# Patient Record
Sex: FEMALE | NOT HISPANIC OR LATINO | Employment: UNEMPLOYED | URBAN - METROPOLITAN AREA
[De-identification: names, ages, dates, MRNs, and addresses within clinical notes are randomized per-mention and may not be internally consistent; named-entity substitution may affect disease eponyms.]

---

## 2017-10-26 ENCOUNTER — OFFICE VISIT (OUTPATIENT)
Dept: MEDICAL GROUP | Facility: MEDICAL CENTER | Age: 27
End: 2017-10-26
Attending: FAMILY MEDICINE
Payer: MEDICAID

## 2017-10-26 VITALS
SYSTOLIC BLOOD PRESSURE: 140 MMHG | RESPIRATION RATE: 18 BRPM | TEMPERATURE: 97.4 F | DIASTOLIC BLOOD PRESSURE: 80 MMHG | OXYGEN SATURATION: 98 % | WEIGHT: 113 LBS | HEIGHT: 64 IN | HEART RATE: 112 BPM | BODY MASS INDEX: 19.29 KG/M2

## 2017-10-26 DIAGNOSIS — R00.0 TACHYCARDIA: ICD-10-CM

## 2017-10-26 DIAGNOSIS — Z30.011 ENCOUNTER FOR INITIAL PRESCRIPTION OF CONTRACEPTIVE PILLS: ICD-10-CM

## 2017-10-26 LAB
INT CON NEG: NORMAL
INT CON POS: NORMAL
POC URINE PREGNANCY TEST: NORMAL

## 2017-10-26 PROCEDURE — 99202 OFFICE O/P NEW SF 15 MIN: CPT | Performed by: FAMILY MEDICINE

## 2017-10-26 PROCEDURE — 99203 OFFICE O/P NEW LOW 30 MIN: CPT | Performed by: FAMILY MEDICINE

## 2017-10-26 PROCEDURE — 81025 URINE PREGNANCY TEST: CPT | Performed by: FAMILY MEDICINE

## 2017-10-26 RX ORDER — NORGESTIMATE AND ETHINYL ESTRADIOL 7DAYSX3 28
1 KIT ORAL DAILY
Qty: 28 TAB | Refills: 11 | Status: SHIPPED | OUTPATIENT
Start: 2017-10-26

## 2017-10-26 ASSESSMENT — PATIENT HEALTH QUESTIONNAIRE - PHQ9: CLINICAL INTERPRETATION OF PHQ2 SCORE: 0

## 2017-10-27 NOTE — PROGRESS NOTES
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 Her last menstrual period was last week, pregnancy test at the office today is negative, patient is sexually active with one male partner at this time     Tachycardia   Noticed that the patient pulse rate is on the high side around 112 today, she says she is just anxious because she is at the doctor's office denies chest pain or shortness of breath or chest discomfort, no history of heart disease or thyroid disease. She says sometimes on exertion or workout she feels that she is short of breath and her heart is racing fast   Denies chest pain today or palpitations    There are no active problems to display for this patient.      Current Outpatient Prescriptions   Medication Sig Dispense Refill   • Norgestim-Eth Estrad Triphasic 0.18/0.215/0.25 MG-35 MCG Tab Take 1 Tab by mouth every day. 28 Tab 11     No current facility-administered medications for this visit.          Allergies as of 10/26/2017   • (No Known Allergies)        ROS: Denies any chest pain, Shortness of breath, Changes bowel or bladder, Lower extremity edema.    Physical Exam:  Gen.: Well-developed, well-nourished, no apparent distress,pleasant and cooperative with the examination  Skin:  Warm and dry with good  turgor. No rashes or suspicious lesions in visible areas  Eye: PERRLA, conjunctiva and sclera clear, lids normal  HEENT: Normocephalic/atraumatic, sinuses nontender with palpation, TMs clear, nares patent with pink mucosa and clear rhinorrhea, lips without lesions, oropharynx clear.  Neck: Trachea midline,no masses or adenopathy  Thyroid: normal consistency and size. No masses or nodules. Not tender with palpation.  Cor:Tachycardic with normal rhythm and rhythm without murmur, gallop or rub.  Lungs: Respirations unlabored.Clear to auscultation with equal breath sounds bilaterally. No wheezes, rhonchi.  Abdomen: Soft nontender without hepatosplenomegaly or masses appreciated, normoactive bowel sounds. No hernias.  Extremities: No cyanosis, clubbing or edema, Symmetrical without deformities or malformations. Pulses 2+ and symmetrical both upper and lower extremities  Lymphatic: No abnormal adenopathy of the neck groin or axillae.  Psych: Alert and oriented x 3.Normal affect, judgement,insight and memory.        Assessment and Plan.   27 y.o. female establish care    1. Encounter for initial prescription of contraceptive pills  Advised to start using oral contraceptive pills with the next. On the third day, to use condoms until the next period, previous test in the office today is negative  - Norgestim-Eth Estrad Triphasic 0.18/0.215/0.25 MG-35 MCG Tab; Take 1 Tab by mouth every day.  Dispense: 28 Tab; Refill: 11    2. Tachycardia  Rule out thyroid disease, rule out anemia. Questionably related to anxiety  - TSH; Future  - FREE THYROXINE; Future  - CBC WITH DIFFERENTIAL; Future      Please note that this dictation was created using voice recognition software. I have made every reasonable attempt to correct obvious errors but there may be errors of grammar and content that I may have overlooked prior to finalization of this note.

## 2018-02-16 ENCOUNTER — HOSPITAL ENCOUNTER (EMERGENCY)
Facility: MEDICAL CENTER | Age: 28
End: 2018-02-16
Attending: EMERGENCY MEDICINE
Payer: MEDICAID

## 2018-02-16 VITALS
RESPIRATION RATE: 18 BRPM | TEMPERATURE: 100 F | HEART RATE: 125 BPM | DIASTOLIC BLOOD PRESSURE: 79 MMHG | BODY MASS INDEX: 19.63 KG/M2 | WEIGHT: 115 LBS | HEIGHT: 64 IN | SYSTOLIC BLOOD PRESSURE: 130 MMHG

## 2018-02-16 DIAGNOSIS — F10.920 ACUTE ALCOHOLIC INTOXICATION WITHOUT COMPLICATION (HCC): ICD-10-CM

## 2018-02-16 DIAGNOSIS — R00.0 TACHYCARDIA: ICD-10-CM

## 2018-02-16 PROCEDURE — 93005 ELECTROCARDIOGRAM TRACING: CPT | Performed by: EMERGENCY MEDICINE

## 2018-02-16 PROCEDURE — 99284 EMERGENCY DEPT VISIT MOD MDM: CPT

## 2018-02-16 PROCEDURE — 36415 COLL VENOUS BLD VENIPUNCTURE: CPT

## 2018-02-16 ASSESSMENT — PAIN SCALES - GENERAL
PAINLEVEL_OUTOF10: 0
PAINLEVEL_OUTOF10: 0

## 2018-02-17 LAB — EKG IMPRESSION: NORMAL

## 2018-02-17 NOTE — ED PROVIDER NOTES
"ED Provider Note    CHIEF COMPLAINT  Chief Complaint   Patient presents with   • Medical Clearance     pt BIB police for med clearance, pt denies any medical problems.  Pt has hx of rapid heart rate, denies any chest pain. Denies any illegal drug use.       HPI  Olive Aburto is a 27 y.o. female who presents for medical clearance, brought in by police, apparently she is intoxicated and found to be very tachycardic. The patient reports that she is always tachycardic, usually between 140 and 160, she tells me that she is \"shocked\" that her heart rate (at 130) is \"so low.\" No chest pain, no shortness of breath, no abdominal pain, no other complaints    REVIEW OF SYSTEMS  Negative for fever, rash, chest pain, dyspnea, abdominal pain, nausea, vomiting, diarrhea, headache, focal weakness, focal numbness, focal tingling, back pain. All other systems are negative.     PAST MEDICAL HISTORY  Past Medical History:   Diagnosis Date   • Heart murmur        FAMILY HISTORY  Family History   Problem Relation Age of Onset   • Cancer Maternal Uncle      leukemia        SOCIAL HISTORY  Social History   Substance Use Topics   • Smoking status: Former Smoker   • Smokeless tobacco: Never Used   • Alcohol use No      Comment: rare       SURGICAL HISTORY  Past Surgical History:   Procedure Laterality Date   • OPEN REDUCTION         CURRENT MEDICATIONS  I personally reviewed the medication list in the charting documentation.     ALLERGIES  No Known Allergies    MEDICAL RECORD  I have reviewed patient's medical record and pertinent results are listed above.      PHYSICAL EXAM  VITAL SIGNS: /78   Pulse (!) 147   Temp 37.8 °C (100 °F)   Resp (!) 22   Ht 1.626 m (5' 4\")   Wt 52.2 kg (115 lb)   BMI 19.74 kg/m²    Constitutional: Tearful otherwise well appearing patient in no acute distress.  Not toxic, nor ill in appearance.  HENT: Mucus membranes moist.    Eyes: No scleral icterus. Normal conjunctiva   Neck: Supple, comfortable, " nonpainful range of motion.   Cardiovascular: Regular and tachycardic.   Thorax & Lungs: Chest is nontender.  Lungs are clear to auscultation with good air movement bilaterally.  No wheeze, rhonchi, nor rales.   Abdomen: Soft, with no tenderness, rebound nor guarding.  No mass or pulsatile mass appreciated.  Skin: Warm, dry. No rash appreciated  Extremities/Musculoskeletal: No sign of trauma. No asymmetric calf tenderness, erythema or edema. Normal range of motion   Neurologic: Slurred speech otherwise alert, oriented and without focal deficits    DIAGNOSTIC STUDIES / PROCEDURES    EKG  12 Lead EKG interpreted by me to show:    Rate 1:30  Rhythm: Sinus tach cardia  Axis: Normal  KS and QRS Intervals: Normal  T waves: No acute changes  ST segments: No acute changes  Ectopy: None.    My impression of this EKG: Does not indicate ischemia or arrythmia at this time.      COURSE & MEDICAL DECISION MAKING  I have reviewed any medical record information, laboratory studies and radiographic results as noted above.    Encounter Summary: This is a 27 y.o. female with alcohol intoxication and tachycardia, brought in by police for medical clearance. She states she is chronically tachycardic between 140 and 160 and is surprised that her heart rate is as low as it is at 130. She has no other complaints or findings on exam, medically cleared, will be discharged to police custody      DISPOSITION: Discharge in the police custody      FINAL IMPRESSION  1. Acute alcoholic intoxication without complication (CMS-HCC)    2. Tachycardia           This dictation was created using voice recognition software. The accuracy of the dictation is limited to the abilities of the software. I expect there may be some errors of grammar and possibly content. The nursing notes were reviewed and certain aspects of this information were incorporated into this note.    Electronically signed by: Emil Martines, 2/16/2018 5:53 PM

## 2018-02-17 NOTE — DISCHARGE INSTRUCTIONS
"Ms. Aburto is medically cleared for incarceration      Alcohol Intoxication  Alcohol intoxication occurs when the amount of alcohol that a person has consumed impairs his or her ability to mentally and physically function. Alcohol directly impairs the normal chemical activity of the brain. Drinking large amounts of alcohol can lead to changes in mental function and behavior, and it can cause many physical effects that can be harmful.   Alcohol intoxication can range in severity from mild to very severe. Various factors can affect the level of intoxication that occurs, such as the person's age, gender, weight, frequency of alcohol consumption, and the presence of other medical conditions (such as diabetes, seizures, or heart conditions). Dangerous levels of alcohol intoxication may occur when people drink large amounts of alcohol in a short period (binge drinking). Alcohol can also be especially dangerous when combined with certain prescription medicines or \"recreational\" drugs.  SIGNS AND SYMPTOMS  Some common signs and symptoms of mild alcohol intoxication include:  · Loss of coordination.  · Changes in mood and behavior.  · Impaired judgment.  · Slurred speech.  As alcohol intoxication progresses to more severe levels, other signs and symptoms will appear. These may include:  · Vomiting.  · Confusion and impaired memory.  · Slowed breathing.  · Seizures.  · Loss of consciousness.  DIAGNOSIS   Your health care provider will take a medical history and perform a physical exam. You will be asked about the amount and type of alcohol you have consumed. Blood tests will be done to measure the concentration of alcohol in your blood. In many places, your blood alcohol level must be lower than 80 mg/dL (0.08%) to legally drive. However, many dangerous effects of alcohol can occur at much lower levels.   TREATMENT   People with alcohol intoxication often do not require treatment. Most of the effects of alcohol intoxication " are temporary, and they go away as the alcohol naturally leaves the body. Your health care provider will monitor your condition until you are stable enough to go home. Fluids are sometimes given through an IV access tube to help prevent dehydration.   HOME CARE INSTRUCTIONS  · Do not drive after drinking alcohol.  · Stay hydrated. Drink enough water and fluids to keep your urine clear or pale yellow. Avoid caffeine.    · Only take over-the-counter or prescription medicines as directed by your health care provider.    SEEK MEDICAL CARE IF:   · You have persistent vomiting.    · You do not feel better after a few days.  · You have frequent alcohol intoxication. Your health care provider can help determine if you should see a substance use treatment counselor.  SEEK IMMEDIATE MEDICAL CARE IF:   · You become shaky or tremble when you try to stop drinking.    · You shake uncontrollably (seizure).    · You throw up (vomit) blood. This may be bright red or may look like black coffee grounds.    · You have blood in your stool. This may be bright red or may appear as a black, tarry, bad smelling stool.    · You become lightheaded or faint.    MAKE SURE YOU:   · Understand these instructions.  · Will watch your condition.  · Will get help right away if you are not doing well or get worse.     This information is not intended to replace advice given to you by your health care provider. Make sure you discuss any questions you have with your health care provider.     Document Released: 09/27/2006 Document Revised: 08/20/2014 Document Reviewed: 05/23/2014  [employer] Interactive Patient Education ©2016 [employer]          Nonspecific Tachycardia  Tachycardia is a faster than normal heartbeat (more than 100 beats per minute). In adults, the heart normally beats between 60 and 100 times a minute. A fast heartbeat may be a normal response to exercise or stress. It does not necessarily mean that something is wrong. However,  sometimes when your heart beats too fast it may not be able to pump enough blood to the rest of your body. This can result in chest pain, shortness of breath, dizziness, and even fainting. Nonspecific tachycardia means that the specific cause or pattern of your tachycardia is unknown.  CAUSES   Tachycardia may be harmless or it may be due to a more serious underlying cause. Possible causes of tachycardia include:  · Exercise or exertion.  · Fever.  · Pain or injury.  · Infection.  · Loss of body fluids (dehydration).  · Overactive thyroid.  · Lack of red blood cells (anemia).  · Anxiety and stress.  · Alcohol.  · Caffeine.  · Tobacco products.  · Diet pills.  · Illegal drugs.  · Heart disease.  SYMPTOMS  · Rapid or irregular heartbeat (palpitations).  · Suddenly feeling your heart beating (cardiac awareness).  · Dizziness.  · Tiredness (fatigue).  · Shortness of breath.  · Chest pain.  · Nausea.  · Fainting.  DIAGNOSIS   Your caregiver will perform a physical exam and take your medical history. In some cases, a heart specialist (cardiologist) may be consulted. Your caregiver may also order:  · Blood tests.  · Electrocardiography. This test records the electrical activity of your heart.  · A heart monitoring test.  TREATMENT   Treatment will depend on the likely cause of your tachycardia. The goal is to treat the underlying cause of your tachycardia. Treatment methods may include:  · Replacement of fluids or blood through an intravenous (IV) tube for moderate to severe dehydration or anemia.  · New medicines or changes in your current medicines.  · Diet and lifestyle changes.  · Treatment for certain infections.  · Stress relief or relaxation methods.  HOME CARE INSTRUCTIONS   · Rest.  · Drink enough fluids to keep your urine clear or pale yellow.  · Do not smoke.  · Avoid:  ¨ Caffeine.  ¨ Tobacco.  ¨ Alcohol.  ¨ Chocolate.  ¨ Stimulants such as over-the-counter diet pills or pills that help you stay  awake.  ¨ Situations that cause anxiety or stress.  ¨ Illegal drugs such as marijuana, phencyclidine (PCP), and cocaine.  · Only take medicine as directed by your caregiver.  · Keep all follow-up appointments as directed by your caregiver.  SEEK IMMEDIATE MEDICAL CARE IF:   · You have pain in your chest, upper arms, jaw, or neck.  · You become weak, dizzy, or feel faint.  · You have palpitations that will not go away.  · You vomit, have diarrhea, or pass blood in your stool.  · Your skin is cool, pale, and wet.  · You have a fever that will not go away with rest, fluids, and medicine.  MAKE SURE YOU:   · Understand these instructions.  · Will watch your condition.  · Will get help right away if you are not doing well or get worse.     This information is not intended to replace advice given to you by your health care provider. Make sure you discuss any questions you have with your health care provider.     Document Released: 01/25/2006 Document Revised: 03/11/2013 Document Reviewed: 11/27/2012  [employer] Interactive Patient Education ©2016 [employer]

## 2018-02-17 NOTE — ED TRIAGE NOTES
Chief Complaint   Patient presents with   • Medical Clearance     pt BIB police for med clearance, pt denies any medical problems.  Pt has hx of rapid heart rate, denies any chest pain. Denies any illegal drug use.

## 2024-07-24 ENCOUNTER — TELEPHONE (OUTPATIENT)
Dept: HEALTH INFORMATION MANAGEMENT | Facility: OTHER | Age: 34
End: 2024-07-24
Payer: MEDICAID

## 2024-07-31 ENCOUNTER — TELEPHONE (OUTPATIENT)
Dept: HEALTH INFORMATION MANAGEMENT | Facility: OTHER | Age: 34
End: 2024-07-31
Payer: MEDICAID

## 2025-08-19 ENCOUNTER — PHARMACY VISIT (OUTPATIENT)
Dept: PHARMACY | Facility: MEDICAL CENTER | Age: 35
End: 2025-08-19
Payer: MEDICARE

## 2025-08-19 ENCOUNTER — HOSPITAL ENCOUNTER (EMERGENCY)
Facility: MEDICAL CENTER | Age: 35
End: 2025-08-19
Attending: EMERGENCY MEDICINE
Payer: COMMERCIAL

## 2025-08-19 ENCOUNTER — APPOINTMENT (OUTPATIENT)
Dept: RADIOLOGY | Facility: MEDICAL CENTER | Age: 35
End: 2025-08-19
Attending: EMERGENCY MEDICINE
Payer: COMMERCIAL

## 2025-08-19 VITALS
HEIGHT: 63 IN | RESPIRATION RATE: 16 BRPM | TEMPERATURE: 98.6 F | SYSTOLIC BLOOD PRESSURE: 121 MMHG | BODY MASS INDEX: 23.04 KG/M2 | HEART RATE: 80 BPM | WEIGHT: 130 LBS | OXYGEN SATURATION: 94 % | DIASTOLIC BLOOD PRESSURE: 70 MMHG

## 2025-08-19 DIAGNOSIS — K04.7 ABSCESSED TOOTH: ICD-10-CM
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
## 2025-08-19 DIAGNOSIS — T14.8XXA STAB WOUND: ICD-10-CM

## 2025-08-19 DIAGNOSIS — S01.81XA FACIAL LACERATION, INITIAL ENCOUNTER: ICD-10-CM

## 2025-08-19 PROCEDURE — 304999 HCHG REPAIR-SIMPLE/INTERMED LEVEL 1

## 2025-08-19 PROCEDURE — 700111 HCHG RX REV CODE 636 W/ 250 OVERRIDE (IP): Performed by: EMERGENCY MEDICINE

## 2025-08-19 PROCEDURE — 70450 CT HEAD/BRAIN W/O DYE: CPT

## 2025-08-19 PROCEDURE — RXMED WILLOW AMBULATORY MEDICATION CHARGE: Performed by: EMERGENCY MEDICINE

## 2025-08-19 PROCEDURE — 305948 HCHG GREEN TRAUMA ACT PRE-NOTIFY NO CC

## 2025-08-19 PROCEDURE — 99284 EMERGENCY DEPT VISIT MOD MDM: CPT

## 2025-08-19 PROCEDURE — 90715 TDAP VACCINE 7 YRS/> IM: CPT | Performed by: EMERGENCY MEDICINE

## 2025-08-19 PROCEDURE — A9270 NON-COVERED ITEM OR SERVICE: HCPCS | Mod: UD | Performed by: EMERGENCY MEDICINE

## 2025-08-19 PROCEDURE — 70486 CT MAXILLOFACIAL W/O DYE: CPT

## 2025-08-19 PROCEDURE — 303353 HCHG DERMABOND SKIN ADHESIVE

## 2025-08-19 PROCEDURE — 700102 HCHG RX REV CODE 250 W/ 637 OVERRIDE(OP): Mod: UD | Performed by: EMERGENCY MEDICINE

## 2025-08-19 PROCEDURE — 90471 IMMUNIZATION ADMIN: CPT

## 2025-08-19 PROCEDURE — 304217 HCHG IRRIGATION SYSTEM

## 2025-08-19 RX ORDER — CLINDAMYCIN HYDROCHLORIDE 300 MG/1
300 CAPSULE ORAL 3 TIMES DAILY
Qty: 21 CAPSULE | Refills: 0 | Status: ACTIVE | OUTPATIENT
Start: 2025-08-19 | End: 2025-08-26

## 2025-08-19 RX ADMIN — IBUPROFEN 800 MG: 200 TABLET, FILM COATED ORAL at 05:31

## 2025-08-19 RX ADMIN — CLOSTRIDIUM TETANI TOXOID ANTIGEN (FORMALDEHYDE INACTIVATED), CORYNEBACTERIUM DIPHTHERIAE TOXOID ANTIGEN (FORMALDEHYDE INACTIVATED), BORDETELLA PERTUSSIS TOXOID ANTIGEN (GLUTARALDEHYDE INACTIVATED), BORDETELLA PERTUSSIS FILAMENTOUS HEMAGGLUTININ ANTIGEN (FORMALDEHYDE INACTIVATED), BORDETELLA PERTUSSIS PERTACTIN ANTIGEN, AND BORDETELLA PERTUSSIS FIMBRIAE 2/3 ANTIGEN 0.5 ML: 5; 2; 2.5; 5; 3; 5 INJECTION, SUSPENSION INTRAMUSCULAR at 03:33
